# Patient Record
Sex: MALE | Race: WHITE | NOT HISPANIC OR LATINO | Employment: FULL TIME | URBAN - METROPOLITAN AREA
[De-identification: names, ages, dates, MRNs, and addresses within clinical notes are randomized per-mention and may not be internally consistent; named-entity substitution may affect disease eponyms.]

---

## 2018-06-26 ENCOUNTER — OFFICE VISIT (OUTPATIENT)
Dept: FAMILY MEDICINE | Facility: CLINIC | Age: 33
End: 2018-06-26
Payer: COMMERCIAL

## 2018-06-26 ENCOUNTER — TELEPHONE (OUTPATIENT)
Dept: FAMILY MEDICINE | Facility: CLINIC | Age: 33
End: 2018-06-26

## 2018-06-26 VITALS
DIASTOLIC BLOOD PRESSURE: 79 MMHG | WEIGHT: 312 LBS | HEART RATE: 82 BPM | BODY MASS INDEX: 48.97 KG/M2 | OXYGEN SATURATION: 96 % | HEIGHT: 67 IN | RESPIRATION RATE: 16 BRPM | SYSTOLIC BLOOD PRESSURE: 142 MMHG

## 2018-06-26 DIAGNOSIS — E16.2 HYPOGLYCEMIA: Primary | ICD-10-CM

## 2018-06-26 DIAGNOSIS — M72.2 PLANTAR FASCIITIS OF LEFT FOOT: ICD-10-CM

## 2018-06-26 DIAGNOSIS — Z72.0 TOBACCO ABUSE DISORDER: ICD-10-CM

## 2018-06-26 DIAGNOSIS — I10 ESSENTIAL HYPERTENSION: ICD-10-CM

## 2018-06-26 PROCEDURE — 3078F DIAST BP <80 MM HG: CPT | Mod: S$GLB,ICN,, | Performed by: FAMILY MEDICINE

## 2018-06-26 PROCEDURE — 99204 OFFICE O/P NEW MOD 45 MIN: CPT | Mod: S$GLB,ICN,, | Performed by: FAMILY MEDICINE

## 2018-06-26 PROCEDURE — 3077F SYST BP >= 140 MM HG: CPT | Mod: S$GLB,ICN,, | Performed by: FAMILY MEDICINE

## 2018-06-26 PROCEDURE — 99999 PR PBB SHADOW E&M-NEW PATIENT-LVL III: CPT | Mod: PBBFAC,,, | Performed by: FAMILY MEDICINE

## 2018-06-26 PROCEDURE — 3008F BODY MASS INDEX DOCD: CPT | Mod: S$GLB,ICN,, | Performed by: FAMILY MEDICINE

## 2018-06-26 RX ORDER — MELOXICAM 15 MG/1
15 TABLET ORAL DAILY
Qty: 30 TABLET | Refills: 2 | Status: SHIPPED | OUTPATIENT
Start: 2018-06-26 | End: 2018-06-26 | Stop reason: SDUPTHER

## 2018-06-26 RX ORDER — MELOXICAM 15 MG/1
15 TABLET ORAL DAILY
Qty: 30 TABLET | Refills: 2 | Status: SHIPPED | OUTPATIENT
Start: 2018-06-26 | End: 2018-09-10

## 2018-06-26 NOTE — PROGRESS NOTES
"Subjective:       Patient ID: Arthur Singleton is a 33 y.o. male.    Chief Complaint: [facility]    Patient is a new patient presenting to establish care.    Has a history of high blood pressure, associated with weight gain.    Also having episodes of hand shaking, sweats, fatigue, for the last few months, worse when he hasn't eaten.    Also complains of left heel pain, worsening, associated with standing for prolonged periods, worst with first step of the day.      Review of Systems   Constitutional: Negative for activity change, appetite change, fatigue and fever.   HENT: Negative for congestion, dental problem, ear discharge, hearing loss, postnasal drip, sinus pain, sneezing and trouble swallowing.    Eyes: Negative for photophobia and discharge.   Respiratory: Negative for apnea, cough and shortness of breath.    Cardiovascular: Negative for chest pain.   Gastrointestinal: Negative for abdominal distention, abdominal pain, blood in stool, diarrhea, nausea and vomiting.   Endocrine: Negative for cold intolerance.   Genitourinary: Negative for difficulty urinating, flank pain, frequency, hematuria and testicular pain.   Musculoskeletal: Positive for arthralgias. Negative for myalgias.   Skin: Negative for color change.   Allergic/Immunologic: Negative for environmental allergies, food allergies and immunocompromised state.   Neurological: Negative for dizziness, syncope, numbness and headaches.   Hematological: Negative for adenopathy. Does not bruise/bleed easily.   Psychiatric/Behavioral: Negative for agitation, confusion, decreased concentration, hallucinations, self-injury and sleep disturbance.   All other systems reviewed and are negative.        Reviewed family, medical, surgical, and social history.    Objective:      BP (!) 142/79 (BP Location: Left arm, Patient Position: Sitting, BP Method: X-Large (Automatic))   Pulse 82   Resp 16   Ht 5' 7" (1.702 m)   Wt (!) 141.5 kg (312 lb)   SpO2 96%   BMI " 48.87 kg/m²   Physical Exam   Constitutional: He is oriented to person, place, and time. He appears well-developed and well-nourished. No distress.   HENT:   Head: Normocephalic and atraumatic.   Nose: Nose normal.   Mouth/Throat: Oropharynx is clear and moist. No oropharyngeal exudate.   Eyes: Conjunctivae and EOM are normal. Pupils are equal, round, and reactive to light.   Neck: Normal range of motion. No thyromegaly present.   Cardiovascular: Normal rate, regular rhythm, normal heart sounds and intact distal pulses.  Exam reveals no gallop and no friction rub.    No murmur heard.  Pulmonary/Chest: Effort normal and breath sounds normal. No respiratory distress. He has no wheezes. He has no rales.   Abdominal: Soft. He exhibits no distension. There is no tenderness. There is no guarding.   Musculoskeletal: Normal range of motion. He exhibits tenderness and deformity. He exhibits no edema.   Neurological: He is alert and oriented to person, place, and time. He displays normal reflexes. No cranial nerve deficit or sensory deficit. He exhibits normal muscle tone. Coordination normal.   Skin: Skin is warm and dry. No rash noted. He is not diaphoretic. No erythema. No pallor.   Psychiatric: He has a normal mood and affect. His behavior is normal. Judgment and thought content normal.   Nursing note and vitals reviewed.      Assessment:       1. Hypoglycemia    2. Essential hypertension    3. Tobacco abuse disorder    4. Plantar fasciitis of left foot        Plan:       Hypoglycemia  -     CBC auto differential; Future; Expected date: 06/26/2018  -     Comprehensive metabolic panel; Future; Expected date: 06/26/2018  -     Hemoglobin A1c; Future; Expected date: 06/26/2018  -     Lipid panel; Future; Expected date: 06/26/2018  -     TSH; Future; Expected date: 06/26/2018  -     INSULIN, RANDOM; Future; Expected date: 06/26/2018    Essential hypertension  -     CBC auto differential; Future; Expected date: 06/26/2018  -      Comprehensive metabolic panel; Future; Expected date: 06/26/2018  -     Hemoglobin A1c; Future; Expected date: 06/26/2018  -     Lipid panel; Future; Expected date: 06/26/2018  -     TSH; Future; Expected date: 06/26/2018    Tobacco abuse disorder    Plantar fasciitis of left foot  -     Discontinue: meloxicam (MOBIC) 15 MG tablet; Take 1 tablet (15 mg total) by mouth once daily.  Dispense: 30 tablet; Refill: 2  -     meloxicam (MOBIC) 15 MG tablet; Take 1 tablet (15 mg total) by mouth once daily.  Dispense: 30 tablet; Refill: 2            Risks, benefits, and side effects were discussed with the patient. All questions were answered to the fullest satisfaction of the patient, and pt verbalized understanding and agreement to treatment plan. Pt was to call with any new or worsening symptoms, or present to the ER.

## 2018-06-26 NOTE — TELEPHONE ENCOUNTER
----- Message from Joseph Diaz sent at 6/26/2018  2:22 PM CDT -----  Type: Needs Medical Advice    Who Called:  Self Symptoms (please be specific):  NA  How long has patient had these symptoms:  VIDYA Pharmacy name and phone #:  [facility] in [place]   Best Call Back Number: [phone]  Additional Information: Patient asking to get recent prescribed rx antibiotic erx [facility] in [place].Ms

## 2018-06-27 ENCOUNTER — LAB VISIT (OUTPATIENT)
Dept: LAB | Facility: HOSPITAL | Age: 33
End: 2018-06-27
Attending: FAMILY MEDICINE
Payer: COMMERCIAL

## 2018-06-27 DIAGNOSIS — I10 ESSENTIAL HYPERTENSION: ICD-10-CM

## 2018-06-27 DIAGNOSIS — E16.2 HYPOGLYCEMIA: ICD-10-CM

## 2018-06-27 LAB
ALBUMIN SERPL BCP-MCNC: 4 G/DL
ALP SERPL-CCNC: 88 U/L
ALT SERPL W/O P-5'-P-CCNC: 28 U/L
ANION GAP SERPL CALC-SCNC: 7 MMOL/L
AST SERPL-CCNC: 24 U/L
BASOPHILS # BLD AUTO: 0.1 K/UL
BASOPHILS NFR BLD: 0.9 %
BILIRUB SERPL-MCNC: 0.4 MG/DL
BUN SERPL-MCNC: 11 MG/DL
CALCIUM SERPL-MCNC: 9 MG/DL
CHLORIDE SERPL-SCNC: 103 MMOL/L
CHOLEST SERPL-MCNC: 192 MG/DL
CHOLEST/HDLC SERPL: 6.4 {RATIO}
CO2 SERPL-SCNC: 30 MMOL/L
CREAT SERPL-MCNC: 1 MG/DL
DIFFERENTIAL METHOD: ABNORMAL
EOSINOPHIL # BLD AUTO: 0.5 K/UL
EOSINOPHIL NFR BLD: 4.2 %
ERYTHROCYTE [DISTWIDTH] IN BLOOD BY AUTOMATED COUNT: 13 %
EST. GFR  (AFRICAN AMERICAN): >60 ML/MIN/1.73 M^2
EST. GFR  (NON AFRICAN AMERICAN): >60 ML/MIN/1.73 M^2
ESTIMATED AVG GLUCOSE: 120 MG/DL
GLUCOSE SERPL-MCNC: 96 MG/DL
HBA1C MFR BLD HPLC: 5.8 %
HCT VFR BLD AUTO: 48.6 %
HDLC SERPL-MCNC: 30 MG/DL
HDLC SERPL: 15.6 %
HGB BLD-MCNC: 16 G/DL
IMM GRANULOCYTES # BLD AUTO: 0.05 K/UL
IMM GRANULOCYTES NFR BLD AUTO: 0.5 %
LDLC SERPL CALC-MCNC: 134 MG/DL
LYMPHOCYTES # BLD AUTO: 3.2 K/UL
LYMPHOCYTES NFR BLD: 29.4 %
MCH RBC QN AUTO: 29.1 PG
MCHC RBC AUTO-ENTMCNC: 32.9 G/DL
MCV RBC AUTO: 89 FL
MONOCYTES # BLD AUTO: 0.7 K/UL
MONOCYTES NFR BLD: 6.3 %
NEUTROPHILS # BLD AUTO: 6.4 K/UL
NEUTROPHILS NFR BLD: 58.7 %
NONHDLC SERPL-MCNC: 162 MG/DL
NRBC BLD-RTO: 0 /100 WBC
PLATELET # BLD AUTO: 262 K/UL
PMV BLD AUTO: 9.1 FL
POTASSIUM SERPL-SCNC: 4.5 MMOL/L
PROT SERPL-MCNC: 7.8 G/DL
RBC # BLD AUTO: 5.49 M/UL
SODIUM SERPL-SCNC: 140 MMOL/L
TRIGL SERPL-MCNC: 140 MG/DL
TSH SERPL DL<=0.005 MIU/L-ACNC: 1.49 UIU/ML
WBC # BLD AUTO: 10.85 K/UL

## 2018-06-27 PROCEDURE — 83036 HEMOGLOBIN GLYCOSYLATED A1C: CPT

## 2018-06-27 PROCEDURE — 84443 ASSAY THYROID STIM HORMONE: CPT

## 2018-06-27 PROCEDURE — 80061 LIPID PANEL: CPT

## 2018-06-27 PROCEDURE — 36415 COLL VENOUS BLD VENIPUNCTURE: CPT

## 2018-06-27 PROCEDURE — 83525 ASSAY OF INSULIN: CPT

## 2018-06-27 PROCEDURE — 80053 COMPREHEN METABOLIC PANEL: CPT

## 2018-06-27 PROCEDURE — 85025 COMPLETE CBC W/AUTO DIFF WBC: CPT

## 2018-06-28 LAB
INSULIN COLLECTION INTERVAL: ABNORMAL
INSULIN SERPL-ACNC: 41.4 UU/ML

## 2018-07-10 ENCOUNTER — OFFICE VISIT (OUTPATIENT)
Dept: FAMILY MEDICINE | Facility: CLINIC | Age: 33
End: 2018-07-10
Payer: COMMERCIAL

## 2018-07-10 VITALS
WEIGHT: 310 LBS | HEART RATE: 81 BPM | DIASTOLIC BLOOD PRESSURE: 85 MMHG | RESPIRATION RATE: 18 BRPM | BODY MASS INDEX: 48.65 KG/M2 | SYSTOLIC BLOOD PRESSURE: 145 MMHG | OXYGEN SATURATION: 95 % | HEIGHT: 67 IN

## 2018-07-10 DIAGNOSIS — I10 ESSENTIAL HYPERTENSION: Primary | ICD-10-CM

## 2018-07-10 PROCEDURE — 3008F BODY MASS INDEX DOCD: CPT | Mod: S$GLB,ICN,, | Performed by: FAMILY MEDICINE

## 2018-07-10 PROCEDURE — 99999 PR PBB SHADOW E&M-EST. PATIENT-LVL III: CPT | Mod: PBBFAC,,, | Performed by: FAMILY MEDICINE

## 2018-07-10 PROCEDURE — 3079F DIAST BP 80-89 MM HG: CPT | Mod: S$GLB,ICN,, | Performed by: FAMILY MEDICINE

## 2018-07-10 PROCEDURE — 99213 OFFICE O/P EST LOW 20 MIN: CPT | Mod: S$GLB,ICN,, | Performed by: FAMILY MEDICINE

## 2018-07-10 PROCEDURE — 3077F SYST BP >= 140 MM HG: CPT | Mod: S$GLB,ICN,, | Performed by: FAMILY MEDICINE

## 2018-07-10 RX ORDER — PHENTERMINE HYDROCHLORIDE 37.5 MG/1
37.5 TABLET ORAL
Qty: 30 TABLET | Refills: 1 | Status: SHIPPED | OUTPATIENT
Start: 2018-07-10 | End: 2018-08-09

## 2018-07-10 RX ORDER — AMLODIPINE BESYLATE 5 MG/1
5 TABLET ORAL DAILY
Qty: 30 TABLET | Refills: 11 | Status: SHIPPED | OUTPATIENT
Start: 2018-07-10 | End: 2019-07-01 | Stop reason: SDUPTHER

## 2018-07-10 NOTE — PROGRESS NOTES
"Subjective:       Patient ID: Arthur Singleton is a 33 y.o. male.    Chief Complaint: Follow-up ( follow up)    Patient presents for follow up.      Patient having trouble losing weight despite diet and exercise, has never tried anything in the past.      Review of Systems   Constitutional: Negative for activity change, appetite change, fatigue and fever.   HENT: Negative for congestion, dental problem, ear discharge, hearing loss, postnasal drip, sinus pain, sneezing and trouble swallowing.    Eyes: Negative for photophobia and discharge.   Respiratory: Negative for apnea, cough and shortness of breath.    Cardiovascular: Negative for chest pain.   Gastrointestinal: Negative for abdominal distention, abdominal pain, blood in stool, diarrhea, nausea and vomiting.   Endocrine: Negative for cold intolerance.   Genitourinary: Negative for difficulty urinating, flank pain, frequency, hematuria and testicular pain.   Musculoskeletal: Negative for arthralgias and myalgias.   Skin: Negative for color change.   Allergic/Immunologic: Negative for environmental allergies, food allergies and immunocompromised state.   Neurological: Negative for dizziness, syncope, numbness and headaches.   Hematological: Negative for adenopathy. Does not bruise/bleed easily.   Psychiatric/Behavioral: Negative for agitation, confusion, decreased concentration, hallucinations, self-injury and sleep disturbance.   All other systems reviewed and are negative.        Reviewed family, medical, surgical, and social history.    Objective:      BP (!) 145/85 (BP Location: Left arm, Patient Position: Sitting, BP Method: X-Large (Automatic))   Pulse 81   Resp 18   Ht 5' 7" (1.702 m)   Wt (!) 140.6 kg (310 lb)   SpO2 95%   BMI 48.55 kg/m²   Physical Exam    Assessment:       1. Essential hypertension    2. Class 3 obesity with body mass index (BMI) of 40.0 to 44.9 in adult, unspecified obesity type, unspecified whether serious comorbidity present  "       Plan:       Essential hypertension  -     amLODIPine (NORVASC) 5 MG tablet; Take 1 tablet (5 mg total) by mouth once daily.  Dispense: 30 tablet; Refill: 11    Class 3 obesity with body mass index (BMI) of 40.0 to 44.9 in adult, unspecified obesity type, unspecified whether serious comorbidity present  -     phentermine (ADIPEX-P) 37.5 mg tablet; Take 1 tablet (37.5 mg total) by mouth before breakfast.  Dispense: 30 tablet; Refill: 1    Will start phentermine and amlodipine, recheck in two weeks.        Risks, benefits, and side effects were discussed with the patient. All questions were answered to the fullest satisfaction of the patient, and pt verbalized understanding and agreement to treatment plan. Pt was to call with any new or worsening symptoms, or present to the ER.

## 2018-09-10 ENCOUNTER — OFFICE VISIT (OUTPATIENT)
Dept: FAMILY MEDICINE | Facility: CLINIC | Age: 33
End: 2018-09-10
Payer: COMMERCIAL

## 2018-09-10 VITALS
WEIGHT: 302.69 LBS | RESPIRATION RATE: 18 BRPM | BODY MASS INDEX: 45.88 KG/M2 | OXYGEN SATURATION: 96 % | DIASTOLIC BLOOD PRESSURE: 75 MMHG | HEIGHT: 68 IN | HEART RATE: 93 BPM | SYSTOLIC BLOOD PRESSURE: 147 MMHG

## 2018-09-10 DIAGNOSIS — I10 ESSENTIAL HYPERTENSION: Primary | ICD-10-CM

## 2018-09-10 DIAGNOSIS — M79.672 PAIN OF LEFT HEEL: ICD-10-CM

## 2018-09-10 PROCEDURE — 3078F DIAST BP <80 MM HG: CPT | Mod: S$GLB,ICN,, | Performed by: FAMILY MEDICINE

## 2018-09-10 PROCEDURE — 3008F BODY MASS INDEX DOCD: CPT | Mod: S$GLB,ICN,, | Performed by: FAMILY MEDICINE

## 2018-09-10 PROCEDURE — 3077F SYST BP >= 140 MM HG: CPT | Mod: S$GLB,ICN,, | Performed by: FAMILY MEDICINE

## 2018-09-10 PROCEDURE — 99999 PR PBB SHADOW E&M-EST. PATIENT-LVL III: CPT | Mod: PBBFAC,,, | Performed by: FAMILY MEDICINE

## 2018-09-10 PROCEDURE — 99214 OFFICE O/P EST MOD 30 MIN: CPT | Mod: S$GLB,ICN,, | Performed by: FAMILY MEDICINE

## 2018-09-10 RX ORDER — PHENTERMINE HYDROCHLORIDE 37.5 MG/1
37.5 CAPSULE ORAL EVERY MORNING
Qty: 30 CAPSULE | Refills: 3 | Status: SHIPPED | OUTPATIENT
Start: 2018-09-10 | End: 2018-10-10

## 2018-09-10 RX ORDER — MELOXICAM 15 MG/1
15 TABLET ORAL DAILY
Qty: 30 TABLET | Refills: 3 | Status: SHIPPED | OUTPATIENT
Start: 2018-09-10

## 2018-09-10 RX ORDER — HYDROCODONE BITARTRATE AND ACETAMINOPHEN 5; 325 MG/1; MG/1
TABLET ORAL
COMMUNITY
Start: 2018-09-05 | End: 2018-09-10

## 2018-09-10 NOTE — PROGRESS NOTES
"Subjective:       Patient ID: Arthur Singleton is a 33 y.o. male.    Chief Complaint: Follow-up    Patient presents for follow up.      Patient having trouble losing weight despite diet and exercise, has never tried anything in the past. Lost 8lbs since the last visit, feels great.    Heel pain improved with mobic, requests refill.    In regards to the patient's hypertension, patient denies chest pain/sob, and has been compliant with the medication regimen.         Review of Systems   Constitutional: Negative for activity change, appetite change, fatigue and fever.   HENT: Negative for congestion, dental problem, ear discharge, hearing loss, postnasal drip, sinus pain, sneezing and trouble swallowing.    Eyes: Negative for photophobia and discharge.   Respiratory: Negative for apnea, cough and shortness of breath.    Cardiovascular: Negative for chest pain.   Gastrointestinal: Negative for abdominal distention, abdominal pain, blood in stool, diarrhea, nausea and vomiting.   Endocrine: Negative for cold intolerance.   Genitourinary: Negative for difficulty urinating, flank pain, frequency, hematuria and testicular pain.   Musculoskeletal: Positive for arthralgias. Negative for myalgias.   Skin: Negative for color change.   Allergic/Immunologic: Negative for environmental allergies, food allergies and immunocompromised state.   Neurological: Negative for dizziness, syncope, numbness and headaches.   Hematological: Negative for adenopathy. Does not bruise/bleed easily.   Psychiatric/Behavioral: Negative for agitation, confusion, decreased concentration, hallucinations, self-injury and sleep disturbance.   All other systems reviewed and are negative.        Reviewed family, medical, surgical, and social history.    Objective:      BP (!) 147/75 (BP Location: Left arm, Patient Position: Sitting, BP Method: X-Large (Automatic))   Pulse 93   Resp 18   Ht 5' 8" (1.727 m)   Wt (!) 137.3 kg (302 lb 11.2 oz)   SpO2 96%   " BMI 46.03 kg/m²   Physical Exam   Constitutional: He is oriented to person, place, and time. He appears well-developed and well-nourished. No distress.   HENT:   Head: Normocephalic and atraumatic.   Nose: Nose normal.   Mouth/Throat: Oropharynx is clear and moist. No oropharyngeal exudate.   Eyes: Conjunctivae and EOM are normal. Pupils are equal, round, and reactive to light.   Neck: Normal range of motion. No thyromegaly present.   Cardiovascular: Normal rate, regular rhythm, normal heart sounds and intact distal pulses. Exam reveals no gallop and no friction rub.   No murmur heard.  Pulmonary/Chest: Effort normal and breath sounds normal. No respiratory distress. He has no wheezes. He has no rales.   Abdominal: Soft. He exhibits no distension. There is no tenderness. There is no guarding.   Musculoskeletal: Normal range of motion. He exhibits no edema, tenderness or deformity.   Neurological: He is alert and oriented to person, place, and time. He displays normal reflexes. No cranial nerve deficit or sensory deficit. He exhibits normal muscle tone. Coordination normal.   Skin: Skin is warm and dry. No rash noted. He is not diaphoretic. No erythema. No pallor.   Psychiatric: He has a normal mood and affect. His behavior is normal. Judgment and thought content normal.   Nursing note and vitals reviewed.      Assessment:       1. Essential hypertension    2. Class 3 obesity with body mass index (BMI) of 40.0 to 44.9 in adult, unspecified obesity type, unspecified whether serious comorbidity present    3. Pain of left heel        Plan:       Essential hypertension    Class 3 obesity with body mass index (BMI) of 40.0 to 44.9 in adult, unspecified obesity type, unspecified whether serious comorbidity present  -     phentermine 37.5 MG capsule; Take 1 capsule (37.5 mg total) by mouth every morning.  Dispense: 30 capsule; Refill: 3    Pain of left heel  -     meloxicam (MOBIC) 15 MG tablet; Take 1 tablet (15 mg total)  by mouth once daily.  Dispense: 30 tablet; Refill: 3            Risks, benefits, and side effects were discussed with the patient. All questions were answered to the fullest satisfaction of the patient, and pt verbalized understanding and agreement to treatment plan. Pt was to call with any new or worsening symptoms, or present to the ER.

## 2018-09-25 ENCOUNTER — TELEPHONE (OUTPATIENT)
Dept: FAMILY MEDICINE | Facility: CLINIC | Age: 33
End: 2018-09-25

## 2018-09-25 NOTE — TELEPHONE ENCOUNTER
Spoke with pts wife, and she was unable to  the Norvasc at [facility].   Spoke with [facility] pharmacy, and verified the refill.   No other concerns at this time.       ----- Message from Ashley Alfaro sent at 9/25/2018  2:50 PM CDT -----  Type: Needs Medical Advice    Who Called: Lauren Singleton - spouse  [id number] - [place], [place] - [address] [address] [facility] [place] [address]  Phone: [phone] Fax: [phone]  Best Call Back Number: [phone]  Additional Information: caller state new blood pressure medication was to be phone in on 09/10/18,she is at the pharmacy and they have not received contact to advise    Thank you

## 2018-12-03 ENCOUNTER — TELEPHONE (OUTPATIENT)
Dept: FAMILY MEDICINE | Facility: CLINIC | Age: 33
End: 2018-12-03

## 2018-12-03 NOTE — TELEPHONE ENCOUNTER
----- Message from Ashley Aquino sent at 12/3/2018  2:40 PM CST -----  Type: Needs Medical Advice    Who Called:  Wife (Ирина)  Best Call Back Number: [phone]  Additional Information: Wife calling concerning recall of medication: Amlodipine/please call back to advise.

## 2019-06-18 ENCOUNTER — PATIENT OUTREACH (OUTPATIENT)
Dept: ADMINISTRATIVE | Facility: HOSPITAL | Age: 34
End: 2019-06-18

## 2019-07-01 ENCOUNTER — OFFICE VISIT (OUTPATIENT)
Dept: FAMILY MEDICINE | Facility: CLINIC | Age: 34
End: 2019-07-01
Payer: COMMERCIAL

## 2019-07-01 VITALS
DIASTOLIC BLOOD PRESSURE: 87 MMHG | BODY MASS INDEX: 47.29 KG/M2 | WEIGHT: 312 LBS | SYSTOLIC BLOOD PRESSURE: 141 MMHG | RESPIRATION RATE: 19 BRPM | HEIGHT: 68 IN | HEART RATE: 87 BPM | OXYGEN SATURATION: 98 %

## 2019-07-01 DIAGNOSIS — Z71.3 WEIGHT LOSS COUNSELING, ENCOUNTER FOR: ICD-10-CM

## 2019-07-01 DIAGNOSIS — R63.5 WEIGHT GAIN: Primary | ICD-10-CM

## 2019-07-01 DIAGNOSIS — I10 ESSENTIAL HYPERTENSION: ICD-10-CM

## 2019-07-01 PROCEDURE — 99999 PR PBB SHADOW E&M-EST. PATIENT-LVL III: CPT | Mod: PBBFAC,,, | Performed by: NURSE PRACTITIONER

## 2019-07-01 PROCEDURE — 99999 PR PBB SHADOW E&M-EST. PATIENT-LVL III: ICD-10-PCS | Mod: PBBFAC,,, | Performed by: NURSE PRACTITIONER

## 2019-07-01 PROCEDURE — 3077F PR MOST RECENT SYSTOLIC BLOOD PRESSURE >= 140 MM HG: ICD-10-PCS | Mod: S$GLB,,, | Performed by: NURSE PRACTITIONER

## 2019-07-01 PROCEDURE — 99213 OFFICE O/P EST LOW 20 MIN: CPT | Mod: S$GLB,,, | Performed by: NURSE PRACTITIONER

## 2019-07-01 PROCEDURE — 3008F PR BODY MASS INDEX (BMI) DOCUMENTED: ICD-10-PCS | Mod: S$GLB,,, | Performed by: NURSE PRACTITIONER

## 2019-07-01 PROCEDURE — 99213 PR OFFICE/OUTPT VISIT, EST, LEVL III, 20-29 MIN: ICD-10-PCS | Mod: S$GLB,,, | Performed by: NURSE PRACTITIONER

## 2019-07-01 PROCEDURE — 3008F BODY MASS INDEX DOCD: CPT | Mod: S$GLB,,, | Performed by: NURSE PRACTITIONER

## 2019-07-01 PROCEDURE — 3079F PR MOST RECENT DIASTOLIC BLOOD PRESSURE 80-89 MM HG: ICD-10-PCS | Mod: S$GLB,,, | Performed by: NURSE PRACTITIONER

## 2019-07-01 PROCEDURE — 3079F DIAST BP 80-89 MM HG: CPT | Mod: S$GLB,,, | Performed by: NURSE PRACTITIONER

## 2019-07-01 PROCEDURE — 3077F SYST BP >= 140 MM HG: CPT | Mod: S$GLB,,, | Performed by: NURSE PRACTITIONER

## 2019-07-01 RX ORDER — AMLODIPINE BESYLATE 5 MG/1
5 TABLET ORAL DAILY
Qty: 30 TABLET | Refills: 11 | Status: SHIPPED | OUTPATIENT
Start: 2019-07-01 | End: 2019-10-17 | Stop reason: SDUPTHER

## 2019-07-01 RX ORDER — PHENTERMINE HYDROCHLORIDE 37.5 MG/1
37.5 TABLET ORAL
Qty: 30 TABLET | Refills: 0 | Status: SHIPPED | OUTPATIENT
Start: 2019-07-01 | End: 2019-07-31

## 2019-07-01 NOTE — PROGRESS NOTES
Subjective:       Patient ID: Arthur Singleton is a 34 y.o. male.    Chief Complaint: Hypertension; Follow-up; and Medication Refill    Arthur Gonzalez is a 34 year old man who presents to the clinic today for medication refill and weight loss counseling. In regards to the patient's hypertension, patient denies chest pain/sob, and has been compliant with the medication regimen. hypertension well controlled. Goal of <130/80. Meds and labs as per orders, however patient has been out of his medication for the last week.  In regards to weight loss, Mr. Singleton is requesting counseling. He has been watching his diet, and trying to be more active.              Review of Systems   Constitutional: Positive for unexpected weight change. Negative for activity change, appetite change, fatigue and fever.   HENT: Negative for congestion, dental problem, ear discharge, hearing loss, postnasal drip, sinus pain, sneezing and trouble swallowing.    Eyes: Negative for photophobia and discharge.   Respiratory: Negative for apnea, cough and shortness of breath.    Cardiovascular: Negative for chest pain, palpitations and leg swelling.   Gastrointestinal: Negative for abdominal distention, abdominal pain, blood in stool, diarrhea, nausea and vomiting.   Endocrine: Negative for cold intolerance.   Genitourinary: Negative for difficulty urinating, flank pain, frequency, hematuria and testicular pain.   Musculoskeletal: Positive for arthralgias. Negative for joint swelling, myalgias and neck pain.   Skin: Negative for color change.   Allergic/Immunologic: Negative for environmental allergies, food allergies and immunocompromised state.   Neurological: Negative for dizziness, syncope, numbness and headaches.   Hematological: Negative for adenopathy. Does not bruise/bleed easily.   Psychiatric/Behavioral: Negative for agitation, confusion, decreased concentration, hallucinations, self-injury and sleep disturbance. The patient is not  "nervous/anxious.    All other systems reviewed and are negative.        Reviewed family, medical, surgical, and social history.    Objective:      BP (!) 141/87 (BP Location: Right arm, Patient Position: Sitting, BP Method: Large (Automatic))   Pulse 87   Resp 19   Ht 5' 8" (1.727 m)   Wt (!) 141.5 kg (312 lb)   SpO2 98%   BMI 47.44 kg/m²   Physical Exam   Constitutional: He is oriented to person, place, and time. He appears well-developed and well-nourished. He is active and cooperative. No distress.   HENT:   Head: Normocephalic and atraumatic.   Right Ear: Hearing, tympanic membrane, external ear and ear canal normal. No drainage. No decreased hearing is noted.   Left Ear: Hearing, tympanic membrane, external ear and ear canal normal. No drainage. No decreased hearing is noted.   Nose: Nose normal. No mucosal edema, rhinorrhea or nasal deformity. No epistaxis.   Mouth/Throat: Uvula is midline, oropharynx is clear and moist and mucous membranes are normal. No uvula swelling. No oropharyngeal exudate or posterior oropharyngeal erythema. No tonsillar exudate.   Eyes: Pupils are equal, round, and reactive to light. Conjunctivae, EOM and lids are normal. Right eye exhibits no discharge. Left eye exhibits no discharge.   Neck: Trachea normal and full passive range of motion without pain. No JVD present. No tracheal tenderness and no muscular tenderness present. Carotid bruit is not present. No edema and no erythema present. No thyroid mass and no thyromegaly present.   Cardiovascular: Normal rate, regular rhythm, S1 normal, S2 normal, normal heart sounds, intact distal pulses and normal pulses.   Pulmonary/Chest: Effort normal and breath sounds normal. No stridor. No tachypnea and no bradypnea. No respiratory distress. He has no decreased breath sounds. He has no wheezes. He has no rhonchi. He has no rales.   Abdominal: Soft. Normal appearance and bowel sounds are normal. He exhibits no distension and no " abdominal bruit. There is no tenderness. There is no guarding and no CVA tenderness.   Musculoskeletal: He exhibits no edema, tenderness or deformity.        Right foot: There is normal range of motion.        Left foot: There is normal range of motion.   Lymphadenopathy:     He has no cervical adenopathy.   Neurological: He is alert and oriented to person, place, and time. He has normal strength. He exhibits normal muscle tone.   Skin: Skin is warm, dry and intact. Capillary refill takes less than 2 seconds. No abrasion, no laceration, no lesion and no rash noted. He is not diaphoretic. No cyanosis. Nails show no clubbing.   Psychiatric: He has a normal mood and affect. His speech is normal and behavior is normal. Judgment and thought content normal. Cognition and memory are normal.       Assessment:       1. Weight gain    2. Essential hypertension    3. Weight loss counseling, encounter for        Plan:       Weight gain    Essential hypertension  -     amLODIPine (NORVASC) 5 MG tablet; Take 1 tablet (5 mg total) by mouth once daily.  Dispense: 30 tablet; Refill: 11  -     CBC auto differential; Future; Expected date: 07/01/2019  -     Comprehensive metabolic panel; Future; Expected date: 07/01/2019  -     Lipid panel; Future; Expected date: 07/01/2019    Weight loss counseling, encounter for  -     phentermine (ADIPEX-P) 37.5 mg tablet; Take 1 tablet (37.5 mg total) by mouth before breakfast.  Dispense: 30 tablet; Refill: 0          PLAN:  - Discussed with patient the plan of care  -  reviewed  - Weight loss guidelines discussed with patient. Phentermine guidelines reviewed.   - Medications reviewed. Medication side effects discussed. Patient has no questions or concerns at this time. Informed patient to notify me regarding any concerns.   - 1400 Calorie diet discussed  - Weight watchers as option  - Exercise 4-5 days for 30 minutes    - Informed patient to please notify me with any questions or concerns at  anytime  - Follow up ordered for 4 weeks       Risks, benefits, and side effects were discussed with the patient. All questions were answered to the fullest satisfaction of the patient, and pt verbalized understanding and agreement to treatment plan. Pt was to call with any new or worsening symptoms, or present to the ER.

## 2019-07-22 ENCOUNTER — PATIENT OUTREACH (OUTPATIENT)
Dept: ADMINISTRATIVE | Facility: HOSPITAL | Age: 34
End: 2019-07-22

## 2019-07-29 ENCOUNTER — LAB VISIT (OUTPATIENT)
Dept: LAB | Facility: HOSPITAL | Age: 34
End: 2019-07-29
Attending: NURSE PRACTITIONER
Payer: COMMERCIAL

## 2019-07-29 DIAGNOSIS — I10 ESSENTIAL HYPERTENSION: ICD-10-CM

## 2019-07-29 LAB
ALBUMIN SERPL BCP-MCNC: 3.6 G/DL (ref 3.5–5.2)
ALP SERPL-CCNC: 75 U/L (ref 55–135)
ALT SERPL W/O P-5'-P-CCNC: 30 U/L (ref 10–44)
ANION GAP SERPL CALC-SCNC: 8 MMOL/L (ref 8–16)
AST SERPL-CCNC: 21 U/L (ref 10–40)
BASOPHILS # BLD AUTO: 0.12 K/UL (ref 0–0.2)
BASOPHILS NFR BLD: 1.2 % (ref 0–1.9)
BILIRUB SERPL-MCNC: 0.5 MG/DL (ref 0.1–1)
BUN SERPL-MCNC: 8 MG/DL (ref 6–20)
CALCIUM SERPL-MCNC: 8.6 MG/DL (ref 8.7–10.5)
CHLORIDE SERPL-SCNC: 100 MMOL/L (ref 95–110)
CHOLEST SERPL-MCNC: 163 MG/DL (ref 120–199)
CHOLEST/HDLC SERPL: 5.8 {RATIO} (ref 2–5)
CO2 SERPL-SCNC: 27 MMOL/L (ref 23–29)
CREAT SERPL-MCNC: 1 MG/DL (ref 0.5–1.4)
DIFFERENTIAL METHOD: NORMAL
EOSINOPHIL # BLD AUTO: 0.4 K/UL (ref 0–0.5)
EOSINOPHIL NFR BLD: 3.5 % (ref 0–8)
ERYTHROCYTE [DISTWIDTH] IN BLOOD BY AUTOMATED COUNT: 12.5 % (ref 11.5–14.5)
EST. GFR  (AFRICAN AMERICAN): >60 ML/MIN/1.73 M^2
EST. GFR  (NON AFRICAN AMERICAN): >60 ML/MIN/1.73 M^2
GLUCOSE SERPL-MCNC: 94 MG/DL (ref 70–110)
HCT VFR BLD AUTO: 50.4 % (ref 40–54)
HDLC SERPL-MCNC: 28 MG/DL (ref 40–75)
HDLC SERPL: 17.2 % (ref 20–50)
HGB BLD-MCNC: 16.3 G/DL (ref 14–18)
IMM GRANULOCYTES # BLD AUTO: 0.04 K/UL (ref 0–0.04)
IMM GRANULOCYTES NFR BLD AUTO: 0.4 % (ref 0–0.5)
LDLC SERPL CALC-MCNC: 105.8 MG/DL (ref 63–159)
LYMPHOCYTES # BLD AUTO: 2.8 K/UL (ref 1–4.8)
LYMPHOCYTES NFR BLD: 27.3 % (ref 18–48)
MCH RBC QN AUTO: 29 PG (ref 27–31)
MCHC RBC AUTO-ENTMCNC: 32.3 G/DL (ref 32–36)
MCV RBC AUTO: 90 FL (ref 82–98)
MONOCYTES # BLD AUTO: 0.7 K/UL (ref 0.3–1)
MONOCYTES NFR BLD: 7.1 % (ref 4–15)
NEUTROPHILS # BLD AUTO: 6.1 K/UL (ref 1.8–7.7)
NEUTROPHILS NFR BLD: 60.5 % (ref 38–73)
NONHDLC SERPL-MCNC: 135 MG/DL
NRBC BLD-RTO: 0 /100 WBC
PLATELET # BLD AUTO: 238 K/UL (ref 150–350)
PMV BLD AUTO: 9.5 FL (ref 9.2–12.9)
POTASSIUM SERPL-SCNC: 4.5 MMOL/L (ref 3.5–5.1)
PROT SERPL-MCNC: 7.3 G/DL (ref 6–8.4)
RBC # BLD AUTO: 5.63 M/UL (ref 4.6–6.2)
SODIUM SERPL-SCNC: 135 MMOL/L (ref 136–145)
TRIGL SERPL-MCNC: 146 MG/DL (ref 30–150)
WBC # BLD AUTO: 10.09 K/UL (ref 3.9–12.7)

## 2019-07-29 PROCEDURE — 80053 COMPREHEN METABOLIC PANEL: CPT

## 2019-07-29 PROCEDURE — 36415 COLL VENOUS BLD VENIPUNCTURE: CPT

## 2019-07-29 PROCEDURE — 80061 LIPID PANEL: CPT

## 2019-07-29 PROCEDURE — 85025 COMPLETE CBC W/AUTO DIFF WBC: CPT

## 2019-08-05 ENCOUNTER — TELEPHONE (OUTPATIENT)
Dept: FAMILY MEDICINE | Facility: CLINIC | Age: 34
End: 2019-08-05

## 2019-08-05 ENCOUNTER — OFFICE VISIT (OUTPATIENT)
Dept: FAMILY MEDICINE | Facility: CLINIC | Age: 34
End: 2019-08-05
Payer: COMMERCIAL

## 2019-08-05 VITALS
HEIGHT: 68 IN | WEIGHT: 313 LBS | DIASTOLIC BLOOD PRESSURE: 89 MMHG | HEART RATE: 79 BPM | RESPIRATION RATE: 19 BRPM | TEMPERATURE: 98 F | SYSTOLIC BLOOD PRESSURE: 136 MMHG | OXYGEN SATURATION: 95 % | BODY MASS INDEX: 47.44 KG/M2

## 2019-08-05 DIAGNOSIS — Z71.6 ENCOUNTER FOR SMOKING CESSATION COUNSELING: Primary | ICD-10-CM

## 2019-08-05 DIAGNOSIS — M79.672 PAIN OF LEFT HEEL: ICD-10-CM

## 2019-08-05 DIAGNOSIS — I10 ESSENTIAL HYPERTENSION: Primary | ICD-10-CM

## 2019-08-05 DIAGNOSIS — R63.5 WEIGHT GAIN: ICD-10-CM

## 2019-08-05 PROCEDURE — 99999 PR PBB SHADOW E&M-EST. PATIENT-LVL III: CPT | Mod: PBBFAC,,, | Performed by: NURSE PRACTITIONER

## 2019-08-05 PROCEDURE — 3075F PR MOST RECENT SYSTOLIC BLOOD PRESS GE 130-139MM HG: ICD-10-PCS | Mod: S$GLB,,, | Performed by: NURSE PRACTITIONER

## 2019-08-05 PROCEDURE — 99999 PR PBB SHADOW E&M-EST. PATIENT-LVL III: ICD-10-PCS | Mod: PBBFAC,,, | Performed by: NURSE PRACTITIONER

## 2019-08-05 PROCEDURE — 99213 OFFICE O/P EST LOW 20 MIN: CPT | Mod: S$GLB,,, | Performed by: NURSE PRACTITIONER

## 2019-08-05 PROCEDURE — 3075F SYST BP GE 130 - 139MM HG: CPT | Mod: S$GLB,,, | Performed by: NURSE PRACTITIONER

## 2019-08-05 PROCEDURE — 3008F PR BODY MASS INDEX (BMI) DOCUMENTED: ICD-10-PCS | Mod: S$GLB,,, | Performed by: NURSE PRACTITIONER

## 2019-08-05 PROCEDURE — 3008F BODY MASS INDEX DOCD: CPT | Mod: S$GLB,,, | Performed by: NURSE PRACTITIONER

## 2019-08-05 PROCEDURE — 3079F DIAST BP 80-89 MM HG: CPT | Mod: S$GLB,,, | Performed by: NURSE PRACTITIONER

## 2019-08-05 PROCEDURE — 99213 PR OFFICE/OUTPT VISIT, EST, LEVL III, 20-29 MIN: ICD-10-PCS | Mod: S$GLB,,, | Performed by: NURSE PRACTITIONER

## 2019-08-05 PROCEDURE — 3079F PR MOST RECENT DIASTOLIC BLOOD PRESSURE 80-89 MM HG: ICD-10-PCS | Mod: S$GLB,,, | Performed by: NURSE PRACTITIONER

## 2019-08-05 RX ORDER — PHENTERMINE HYDROCHLORIDE 37.5 MG/1
37.5 TABLET ORAL
Qty: 30 TABLET | Refills: 0 | Status: SHIPPED | OUTPATIENT
Start: 2019-08-05 | End: 2019-09-04

## 2019-08-05 NOTE — TELEPHONE ENCOUNTER
----- Message from Zee Hanson sent at 8/5/2019  4:31 PM CDT -----  Contact: [phone]  Patient requesting a prescription for chantix.    Patient will be using   [facility] - [place], [place] - [address] [facility] [address]  [address]  [place] [address]  Phone: [phone] Fax: [phone]    Please call patient at [phone].     Thanks!

## 2019-08-05 NOTE — PROGRESS NOTES
Subjective:       Patient ID: Arthur Singleton is a 34 y.o. male.    Chief Complaint: Follow-up (1 month )    Arthur Singleton is present here today for medication refill and weight loss counseling. In regards to the patient's hypertension, patient denies chest pain/sob, and has been compliant with the medication regimen. hypertension well controlled. Goal of <130/80. Meds and labs as per orders.   He reports he is not counting calories. He started the gym 1 week ago. He reports no change in adipex with appetite.    He has been watching his diet, and trying to be more active.        Health maintenance:  - Tetanus: Primary care [facility]in Egan [facility]         Review of Systems   Constitutional: Negative for activity change, chills, fatigue, fever and unexpected weight change.   HENT: Negative for congestion, ear pain, postnasal drip, sinus pressure, sneezing, sore throat and tinnitus.    Eyes: Negative for pain, redness and visual disturbance.   Respiratory: Negative for apnea, cough, chest tightness, shortness of breath and wheezing.    Cardiovascular: Negative for chest pain, palpitations and leg swelling.   Gastrointestinal: Negative for abdominal distention, abdominal pain, blood in stool, constipation, diarrhea, nausea and vomiting.   Endocrine: Negative for polydipsia, polyphagia and polyuria.   Genitourinary: Negative for difficulty urinating, dysuria, flank pain, frequency, hematuria and urgency.   Musculoskeletal: Negative for arthralgias, back pain, joint swelling and myalgias.   Skin: Negative for color change, pallor and rash.   Allergic/Immunologic: Negative for environmental allergies, food allergies and immunocompromised state.   Neurological: Negative for dizziness, tremors, syncope, weakness, light-headedness and headaches.   Hematological: Negative for adenopathy. Does not bruise/bleed easily.   Psychiatric/Behavioral: Negative for agitation, confusion, decreased concentration, self-injury, sleep  "disturbance and suicidal ideas. The patient is not nervous/anxious and is not hyperactive.          Reviewed family, medical, surgical, and social history.    Objective:      /89 (BP Location: Right arm, Patient Position: Sitting, BP Method: Large (Automatic))   Pulse 79   Temp 98.4 °F (36.9 °C) (Tympanic)   Resp 19   Ht 5' 8" (1.727 m)   Wt (!) 142 kg (313 lb)   SpO2 95%   BMI 47.59 kg/m²   Physical Exam   Constitutional: He is oriented to person, place, and time. He appears well-developed and well-nourished. He is active and cooperative. No distress.   HENT:   Head: Normocephalic and atraumatic.   Right Ear: Hearing, tympanic membrane, external ear and ear canal normal. No drainage. No decreased hearing is noted.   Left Ear: Hearing, tympanic membrane, external ear and ear canal normal. No drainage. No decreased hearing is noted.   Nose: Nose normal. No mucosal edema, rhinorrhea or nasal deformity. No epistaxis.   Mouth/Throat: Uvula is midline, oropharynx is clear and moist and mucous membranes are normal. Abnormal dentition. No uvula swelling. No oropharyngeal exudate or posterior oropharyngeal erythema. No tonsillar exudate.   Eyes: Pupils are equal, round, and reactive to light. Conjunctivae, EOM and lids are normal. Right eye exhibits no discharge. Left eye exhibits no discharge.   Neck: Trachea normal and full passive range of motion without pain. No JVD present. No tracheal tenderness and no muscular tenderness present. Carotid bruit is not present. No edema and no erythema present. No thyroid mass and no thyromegaly present.   Cardiovascular: Normal rate, regular rhythm, S1 normal, S2 normal, normal heart sounds, intact distal pulses and normal pulses.   Pulmonary/Chest: Effort normal and breath sounds normal. No stridor. No tachypnea and no bradypnea. No respiratory distress. He has no decreased breath sounds. He has no wheezes. He has no rhonchi. He has no rales.   Abdominal: Soft. Normal " appearance and bowel sounds are normal. He exhibits no distension and no abdominal bruit. There is no tenderness. There is no guarding and no CVA tenderness.   Musculoskeletal: He exhibits no edema, tenderness or deformity.        Right foot: There is normal range of motion.        Left foot: There is normal range of motion.   Lymphadenopathy:     He has no cervical adenopathy.   Neurological: He is alert and oriented to person, place, and time. He has normal strength. He exhibits normal muscle tone.   Skin: Skin is warm, dry and intact. Capillary refill takes less than 2 seconds. No abrasion, no laceration, no lesion and no rash noted. He is not diaphoretic. No cyanosis. Nails show no clubbing.   Psychiatric: He has a normal mood and affect. His speech is normal and behavior is normal. Judgment and thought content normal. Cognition and memory are normal.       Assessment:       1. Essential hypertension    2. Weight gain        Plan:       Essential hypertension    Weight gain          PLAN:  - Discussed with patient the plan of care  - Medications reviewed. Medication side effects discussed. Patient has no questions or concerns at this time. Informed patient to notify me regarding any concerns.  -  reviewed  - 1500 calorie intake    - Increase exercise to 20 minutes   - Informed patient to please notify me with any questions or concerns at anytime  - Follow up ordered for 4 weeks          Risks, benefits, and side effects were discussed with the patient. All questions were answered to the fullest satisfaction of the patient, and pt verbalized understanding and agreement to treatment plan. Pt was to call with any new or worsening symptoms, or present to the ER.

## 2019-08-05 NOTE — TELEPHONE ENCOUNTER
Pt was seen today and stated that he forgot to ask for medication to help him quit smoking. Pt is requesting a refill for Chantix.

## 2019-08-06 ENCOUNTER — PATIENT OUTREACH (OUTPATIENT)
Dept: ADMINISTRATIVE | Facility: HOSPITAL | Age: 34
End: 2019-08-06

## 2019-08-06 RX ORDER — VARENICLINE TARTRATE 0.5 (11)-1
KIT ORAL
Qty: 1 PACKAGE | Refills: 0 | Status: SHIPPED | OUTPATIENT
Start: 2019-08-06

## 2019-08-20 ENCOUNTER — PATIENT OUTREACH (OUTPATIENT)
Dept: ADMINISTRATIVE | Facility: HOSPITAL | Age: 34
End: 2019-08-20

## 2019-10-17 DIAGNOSIS — I10 ESSENTIAL HYPERTENSION: ICD-10-CM

## 2019-10-17 RX ORDER — AMLODIPINE BESYLATE 5 MG/1
5 TABLET ORAL DAILY
Qty: 30 TABLET | Refills: 11 | Status: SHIPPED | OUTPATIENT
Start: 2019-10-17 | End: 2020-10-16

## 2019-10-17 NOTE — TELEPHONE ENCOUNTER
Informed pt requested medication was sent to his pharmacy. Also informed him that he will needed an appointment made for future refills. Pt verbalized understanding.

## 2019-10-17 NOTE — TELEPHONE ENCOUNTER
----- Message from Araceli Donohue sent at 10/17/2019  3:03 PM CDT -----  Contact: patient  Type:  RX Refill Request    Who Called:  patient  Refill or New Rx:  Refill  RX Name and Strength:  amLODIPine (NORVASC) 5 MG tablet  How is the patient currently taking it? (ex. 1XDay):  1 x day  Is this a 30 day or 90 day RX:  30  Preferred Pharmacy with phone number:    [id number] - [place], [place] - [address]  [address] [place] [address]  Phone: [phone] Fax: [phone]  Local or Mail Order:  local  Ordering Provider:  kelly Beach Call Back Number:  [phone]  Additional Information:  n/a

## 2019-10-17 NOTE — TELEPHONE ENCOUNTER
I will refill, please make sure this patient has a routine appointment with me for further refills.